# Patient Record
Sex: FEMALE | Employment: STUDENT | URBAN - METROPOLITAN AREA
[De-identification: names, ages, dates, MRNs, and addresses within clinical notes are randomized per-mention and may not be internally consistent; named-entity substitution may affect disease eponyms.]

---
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
## 2023-04-14 SDOH — HEALTH STABILITY: PHYSICAL HEALTH: ON AVERAGE, HOW MANY DAYS PER WEEK DO YOU ENGAGE IN MODERATE TO STRENUOUS EXERCISE (LIKE A BRISK WALK)?: 3 DAYS

## 2023-04-14 ASSESSMENT — SOCIAL DETERMINANTS OF HEALTH (SDOH)
WITHIN THE LAST YEAR, HAVE YOU BEEN HUMILIATED OR EMOTIONALLY ABUSED IN OTHER WAYS BY YOUR PARTNER OR EX-PARTNER?: PATIENT DECLINED
WITHIN THE LAST YEAR, HAVE TO BEEN RAPED OR FORCED TO HAVE ANY KIND OF SEXUAL ACTIVITY BY YOUR PARTNER OR EX-PARTNER?: NO
WITHIN THE LAST YEAR, HAVE YOU BEEN AFRAID OF YOUR PARTNER OR EX-PARTNER?: NO
WITHIN THE LAST YEAR, HAVE YOU BEEN KICKED, HIT, SLAPPED, OR OTHERWISE PHYSICALLY HURT BY YOUR PARTNER OR EX-PARTNER?: NO

## 2023-04-17 ENCOUNTER — OFFICE VISIT (OUTPATIENT)
Dept: FAMILY MEDICINE CLINIC | Age: 23
End: 2023-04-17

## 2023-04-17 VITALS
OXYGEN SATURATION: 99 % | TEMPERATURE: 98.2 F | HEIGHT: 61 IN | HEART RATE: 80 BPM | DIASTOLIC BLOOD PRESSURE: 80 MMHG | WEIGHT: 141 LBS | BODY MASS INDEX: 26.62 KG/M2 | SYSTOLIC BLOOD PRESSURE: 112 MMHG

## 2023-04-17 DIAGNOSIS — F32.A ANXIETY AND DEPRESSION: Primary | ICD-10-CM

## 2023-04-17 DIAGNOSIS — F41.9 ANXIETY AND DEPRESSION: Primary | ICD-10-CM

## 2023-04-17 RX ORDER — BUPROPION HYDROCHLORIDE 150 MG/1
150 TABLET ORAL EVERY MORNING
Qty: 30 TABLET | Refills: 1 | Status: SHIPPED | OUTPATIENT
Start: 2023-04-17

## 2023-04-17 RX ORDER — SECUKINUMAB 150 MG/ML
300 INJECTION SUBCUTANEOUS
COMMUNITY

## 2023-04-17 SDOH — ECONOMIC STABILITY: FOOD INSECURITY: WITHIN THE PAST 12 MONTHS, THE FOOD YOU BOUGHT JUST DIDN'T LAST AND YOU DIDN'T HAVE MONEY TO GET MORE.: NEVER TRUE

## 2023-04-17 SDOH — ECONOMIC STABILITY: INCOME INSECURITY: HOW HARD IS IT FOR YOU TO PAY FOR THE VERY BASICS LIKE FOOD, HOUSING, MEDICAL CARE, AND HEATING?: NOT HARD AT ALL

## 2023-04-17 SDOH — ECONOMIC STABILITY: HOUSING INSECURITY
IN THE LAST 12 MONTHS, WAS THERE A TIME WHEN YOU DID NOT HAVE A STEADY PLACE TO SLEEP OR SLEPT IN A SHELTER (INCLUDING NOW)?: NO

## 2023-04-17 SDOH — ECONOMIC STABILITY: FOOD INSECURITY: WITHIN THE PAST 12 MONTHS, YOU WORRIED THAT YOUR FOOD WOULD RUN OUT BEFORE YOU GOT MONEY TO BUY MORE.: NEVER TRUE

## 2023-04-17 ASSESSMENT — PATIENT HEALTH QUESTIONNAIRE - PHQ9
8. MOVING OR SPEAKING SO SLOWLY THAT OTHER PEOPLE COULD HAVE NOTICED. OR THE OPPOSITE, BEING SO FIGETY OR RESTLESS THAT YOU HAVE BEEN MOVING AROUND A LOT MORE THAN USUAL: 3
SUM OF ALL RESPONSES TO PHQ QUESTIONS 1-9: 26
SUM OF ALL RESPONSES TO PHQ QUESTIONS 1-9: 26
5. POOR APPETITE OR OVEREATING: 3
3. TROUBLE FALLING OR STAYING ASLEEP: 3
1. LITTLE INTEREST OR PLEASURE IN DOING THINGS: 2
SUM OF ALL RESPONSES TO PHQ9 QUESTIONS 1 & 2: 5
4. FEELING TIRED OR HAVING LITTLE ENERGY: 3
7. TROUBLE CONCENTRATING ON THINGS, SUCH AS READING THE NEWSPAPER OR WATCHING TELEVISION: 3
6. FEELING BAD ABOUT YOURSELF - OR THAT YOU ARE A FAILURE OR HAVE LET YOURSELF OR YOUR FAMILY DOWN: 3
9. THOUGHTS THAT YOU WOULD BE BETTER OFF DEAD, OR OF HURTING YOURSELF: 3
10. IF YOU CHECKED OFF ANY PROBLEMS, HOW DIFFICULT HAVE THESE PROBLEMS MADE IT FOR YOU TO DO YOUR WORK, TAKE CARE OF THINGS AT HOME, OR GET ALONG WITH OTHER PEOPLE: 0
2. FEELING DOWN, DEPRESSED OR HOPELESS: 3
SUM OF ALL RESPONSES TO PHQ QUESTIONS 1-9: 26
SUM OF ALL RESPONSES TO PHQ QUESTIONS 1-9: 23

## 2023-04-17 ASSESSMENT — COLUMBIA-SUICIDE SEVERITY RATING SCALE - C-SSRS
3. HAVE YOU BEEN THINKING ABOUT HOW YOU MIGHT KILL YOURSELF?: YES
4. HAVE YOU HAD THESE THOUGHTS AND HAD SOME INTENTION OF ACTING ON THEM?: NO
2. HAVE YOU ACTUALLY HAD ANY THOUGHTS OF KILLING YOURSELF?: YES
1. WITHIN THE PAST MONTH, HAVE YOU WISHED YOU WERE DEAD OR WISHED YOU COULD GO TO SLEEP AND NOT WAKE UP?: YES
6. HAVE YOU EVER DONE ANYTHING, STARTED TO DO ANYTHING, OR PREPARED TO DO ANYTHING TO END YOUR LIFE?: NO
5. HAVE YOU STARTED TO WORK OUT OR WORKED OUT THE DETAILS OF HOW TO KILL YOURSELF? DO YOU INTEND TO CARRY OUT THIS PLAN?: NO

## 2023-04-17 ASSESSMENT — ENCOUNTER SYMPTOMS
COLOR CHANGE: 0
VOMITING: 0
COUGH: 0
NAUSEA: 0
CONSTIPATION: 0
ABDOMINAL PAIN: 0
DIARRHEA: 0
SHORTNESS OF BREATH: 0

## 2023-04-17 NOTE — PROGRESS NOTES
Visit Information    Have you changed or started any medications since your last visit including any over-the-counter medicines, vitamins, or herbal medicines? no   Are you having any side effects from any of your medications? -  no  Have you stopped taking any of your medications? Is so, why? -  no    Have you seen any other physician or provider since your last visit? No  Have you had any other diagnostic tests since your last visit? No  Have you been seen in the emergency room and/or had an admission to a hospital since we last saw you? No  Have you had your routine dental cleaning in the past 6 months? no    Have you activated your [employer] account? If not, what are your barriers?  Yes     Patient Care Team:  Meka Osorio PA-C as PCP - General (Physician Assistant)    Medical History Review  Past Medical, Family, and Social History reviewed and  contribute to the patient presenting condition    Health Maintenance   Topic Date Due    COVID-19 Vaccine (1) Never done    Hepatitis A vaccine (2 of 2 - 2-dose series) 02/22/2007    HPV vaccine (1 - 2-dose series) Never done    Depression Screen  Never done    HIV screen  Never done    Chlamydia/GC screen  Never done    Hepatitis C screen  Never done    Flu vaccine (Season Ended) 08/01/2023    DTaP/Tdap/Td vaccine (7 - Td or Tdap) 10/21/2023    Hib vaccine  Completed    Varicella vaccine  Completed    Meningococcal (ACWY) vaccine  Completed    Pneumococcal 0-64 years Vaccine  Aged Out

## 2023-04-17 NOTE — PROGRESS NOTES
[facility]  [address] [facility] [address] [address]  Dept: [phone]  Dept Fax: [phone]    Tip Stiles is a 23 y.o. female who presents today for her medical conditions/complaintsas noted below. Tip Stiles is c/o of   Chief Complaint   Patient presents with    Anxiety     Looking to get a referral to see psychology  In the past she had been on Wellburtin, lexapro, trazodone    Depression    New Patient     Referral to see Gyn         HPI:     HPI    Patient is new to the office today. She reports she has a history of anxiety and depression since high school. She lives with her brother and grandmother. She has a lot of stress financially. Works full time as a manager at [employer]. Helps provide for her gma and younger brother. Making ends meet ok. She has been on zoloft, wellbutrin, lexapro, trazodone in the past. She has seen a therapist in the past along with a psychiatrist at [facility]. She feels the wellbutrin helped the most.    She does use ecigs and THC daily. States she has done sychidellic drugs in the past. Feels that \"messed up my brain more\". Has also used cocaine and meth in the past but did not like how she felt on these. Denies any present suicidal thoughts. She does like her job but feels limited support from her family and boyfriend. She has been trying to work on being stronger for herself. Has not had good support from family due to choices with transgender partner. She recently adopted a cat which she loves. Jasion is her name. Also has a dog named Brittney. She has been trying to cut off vaping, she is presently cutting down.      No results found for: LABA1C          ( goal A1Cis < 7)   No results found for: LABMICR  No results found for: LDLCHOLESTEROL, LDLCALC    (goal LDL is <100)   No results found for: AST, ALT, BUN, CR  BP Readings from Last 3 Encounters:   04/17/23 112/80   12/25/18 (!) 144/89 (>99 %, Z

## 2023-04-18 ENCOUNTER — HOSPITAL ENCOUNTER (OUTPATIENT)
Age: 23
Setting detail: SPECIMEN
Discharge: HOME | End: 2023-04-18

## 2023-04-18 DIAGNOSIS — F41.9 ANXIETY AND DEPRESSION: ICD-10-CM

## 2023-04-18 DIAGNOSIS — F32.A ANXIETY AND DEPRESSION: ICD-10-CM

## 2023-04-18 LAB
25(OH)D3 SERPL-MCNC: 16.2 NG/ML
ABSOLUTE EOS #: 0.14 K/UL (ref 0–0.44)
ABSOLUTE IMMATURE GRANULOCYTE: 0.03 K/UL (ref 0–0.3)
ABSOLUTE LYMPH #: 3.2 K/UL (ref 1.2–5.2)
ABSOLUTE MONO #: 0.54 K/UL (ref 0.1–1.4)
ALBUMIN SERPL-MCNC: 4.2 G/DL (ref 3.5–5.2)
ALBUMIN/GLOBULIN RATIO: 1.4 (ref 1–2.5)
ALP SERPL-CCNC: 108 U/L (ref 35–104)
ALT SERPL-CCNC: 16 U/L (ref 5–33)
ANION GAP SERPL CALCULATED.3IONS-SCNC: 12 MMOL/L (ref 9–17)
AST SERPL-CCNC: 20 U/L
BASOPHILS # BLD: 1 % (ref 0–2)
BASOPHILS ABSOLUTE: 0.04 K/UL (ref 0–0.2)
BILIRUB SERPL-MCNC: <0.1 MG/DL (ref 0.3–1.2)
BUN SERPL-MCNC: 5 MG/DL (ref 6–20)
CALCIUM SERPL-MCNC: 9.3 MG/DL (ref 8.6–10.4)
CHLORIDE SERPL-SCNC: 103 MMOL/L (ref 98–107)
CO2 SERPL-SCNC: 27 MMOL/L (ref 20–31)
CREAT SERPL-MCNC: 0.54 MG/DL (ref 0.5–0.9)
EOSINOPHILS RELATIVE PERCENT: 2 % (ref 1–4)
GFR SERPL CREATININE-BSD FRML MDRD: >60 ML/MIN/1.73M2
GLUCOSE SERPL-MCNC: 91 MG/DL (ref 70–99)
HCT VFR BLD AUTO: 41 % (ref 36.3–47.1)
HGB BLD-MCNC: 12.9 G/DL (ref 11.9–15.1)
IMMATURE GRANULOCYTES: 0 %
LYMPHOCYTES # BLD: 40 % (ref 25–45)
MCH RBC QN AUTO: 28.7 PG (ref 25.2–33.5)
MCHC RBC AUTO-ENTMCNC: 31.5 G/DL (ref 28.4–34.8)
MCV RBC AUTO: 91.3 FL (ref 82.6–102.9)
MONOCYTES # BLD: 7 % (ref 2–8)
NRBC AUTOMATED: 0 PER 100 WBC
PDW BLD-RTO: 12.3 % (ref 11.8–14.4)
PLATELET # BLD AUTO: 319 K/UL (ref 138–453)
PMV BLD AUTO: 9.9 FL (ref 8.1–13.5)
POTASSIUM SERPL-SCNC: 3.8 MMOL/L (ref 3.7–5.3)
PROT SERPL-MCNC: 7.2 G/DL (ref 6.4–8.3)
RBC # BLD: 4.49 M/UL (ref 3.95–5.11)
SEG NEUTROPHILS: 50 % (ref 34–64)
SEGMENTED NEUTROPHILS ABSOLUTE COUNT: 4.04 K/UL (ref 1.8–8)
SODIUM SERPL-SCNC: 142 MMOL/L (ref 135–144)
TSH SERPL-ACNC: 1.36 UIU/ML (ref 0.3–5)
WBC # BLD AUTO: 8 K/UL (ref 4.5–13.5)

## 2023-04-20 ENCOUNTER — HOSPITAL ENCOUNTER (EMERGENCY)
Age: 23
Discharge: HOME | End: 2023-04-20
Attending: EMERGENCY MEDICINE
Payer: COMMERCIAL

## 2023-04-20 ENCOUNTER — TELEPHONE (OUTPATIENT)
Dept: FAMILY MEDICINE CLINIC | Age: 23
End: 2023-04-20

## 2023-04-20 VITALS
TEMPERATURE: 98.7 F | RESPIRATION RATE: 16 BRPM | SYSTOLIC BLOOD PRESSURE: 116 MMHG | HEART RATE: 74 BPM | DIASTOLIC BLOOD PRESSURE: 72 MMHG | OXYGEN SATURATION: 100 %

## 2023-04-20 DIAGNOSIS — R10.2 VAGINAL PAIN: Primary | ICD-10-CM

## 2023-04-20 DIAGNOSIS — E55.9 VITAMIN D DEFICIENCY: Primary | ICD-10-CM

## 2023-04-20 LAB
ABSOLUTE EOS #: 0.05 K/UL (ref 0–0.44)
ABSOLUTE IMMATURE GRANULOCYTE: 0.02 K/UL (ref 0–0.3)
ABSOLUTE LYMPH #: 2.01 K/UL (ref 1.2–5.2)
ABSOLUTE MONO #: 0.39 K/UL (ref 0.1–1.4)
ANION GAP SERPL CALCULATED.3IONS-SCNC: 10 MMOL/L (ref 9–17)
BASOPHILS # BLD: 1 % (ref 0–2)
BASOPHILS ABSOLUTE: 0.04 K/UL (ref 0–0.2)
BILIRUBIN URINE: NEGATIVE
BUN SERPL-MCNC: 6 MG/DL (ref 6–20)
BUN/CREAT BLD: 12 (ref 9–20)
CALCIUM SERPL-MCNC: 9.3 MG/DL (ref 8.6–10.4)
CHLORIDE SERPL-SCNC: 107 MMOL/L (ref 98–107)
CO2 SERPL-SCNC: 28 MMOL/L (ref 20–31)
COLOR: YELLOW
COMMENT UA: NORMAL
CREAT SERPL-MCNC: 0.5 MG/DL (ref 0.5–0.9)
EOSINOPHILS RELATIVE PERCENT: 1 % (ref 1–4)
GFR SERPL CREATININE-BSD FRML MDRD: >60 ML/MIN/1.73M2
GLUCOSE SERPL-MCNC: 133 MG/DL (ref 70–99)
GLUCOSE UR STRIP.AUTO-MCNC: NEGATIVE MG/DL
HCG(URINE) PREGNANCY TEST: NEGATIVE
HCT VFR BLD AUTO: 41.2 % (ref 36.3–47.1)
HGB BLD-MCNC: 13.1 G/DL (ref 11.9–15.1)
IMMATURE GRANULOCYTES: 0 %
KETONES UR STRIP.AUTO-MCNC: NEGATIVE MG/DL
LEUKOCYTE ESTERASE UR QL STRIP.AUTO: NEGATIVE
LYMPHOCYTES # BLD: 29 % (ref 25–45)
MCH RBC QN AUTO: 28.3 PG (ref 25.2–33.5)
MCHC RBC AUTO-ENTMCNC: 31.8 G/DL (ref 28.4–34.8)
MCV RBC AUTO: 89 FL (ref 82.6–102.9)
MONOCYTES # BLD: 6 % (ref 2–8)
NITRITE UR QL STRIP.AUTO: NEGATIVE
NRBC AUTOMATED: 0 PER 100 WBC
PDW BLD-RTO: 12.5 % (ref 11.8–14.4)
PLATELET # BLD AUTO: 293 K/UL (ref 138–453)
PMV BLD AUTO: 9.3 FL (ref 8.1–13.5)
POTASSIUM SERPL-SCNC: 3.6 MMOL/L (ref 3.7–5.3)
PROT UR STRIP.AUTO-MCNC: 6.5 MG/DL (ref 5–8)
PROT UR STRIP.AUTO-MCNC: NEGATIVE MG/DL
RBC # BLD: 4.63 M/UL (ref 3.95–5.11)
SEG NEUTROPHILS: 63 % (ref 34–64)
SEGMENTED NEUTROPHILS ABSOLUTE COUNT: 4.54 K/UL (ref 1.8–8)
SODIUM SERPL-SCNC: 145 MMOL/L (ref 135–144)
SPECIFIC GRAVITY UA: 1.01 (ref 1–1.03)
TURBIDITY: CLEAR
URINE HGB: NEGATIVE
UROBILINOGEN, URINE: NORMAL
WBC # BLD AUTO: 7.1 K/UL (ref 4.5–13.5)

## 2023-04-20 PROCEDURE — 80048 BASIC METABOLIC PNL TOTAL CA: CPT

## 2023-04-20 PROCEDURE — 85025 COMPLETE CBC W/AUTO DIFF WBC: CPT

## 2023-04-20 PROCEDURE — 81025 URINE PREGNANCY TEST: CPT

## 2023-04-20 PROCEDURE — 81003 URINALYSIS AUTO W/O SCOPE: CPT

## 2023-04-20 PROCEDURE — 99285 EMERGENCY DEPT VISIT HI MDM: CPT

## 2023-04-20 RX ORDER — ERGOCALCIFEROL 1.25 MG/1
50000 CAPSULE ORAL WEEKLY
Qty: 12 CAPSULE | Refills: 1 | Status: SHIPPED | OUTPATIENT
Start: 2023-04-20

## 2023-04-20 ASSESSMENT — PAIN DESCRIPTION - LOCATION: LOCATION: VAGINA

## 2023-04-20 ASSESSMENT — ENCOUNTER SYMPTOMS
EYE DISCHARGE: 0
VOMITING: 0
EYE REDNESS: 0
COLOR CHANGE: 0
SHORTNESS OF BREATH: 0
COUGH: 0
CONSTIPATION: 0
DIARRHEA: 0
ABDOMINAL PAIN: 0
FACIAL SWELLING: 0

## 2023-04-20 ASSESSMENT — PAIN - FUNCTIONAL ASSESSMENT: PAIN_FUNCTIONAL_ASSESSMENT: 0-10

## 2023-04-20 ASSESSMENT — PAIN DESCRIPTION - FREQUENCY: FREQUENCY: CONTINUOUS

## 2023-04-20 ASSESSMENT — PAIN SCALES - GENERAL: PAINLEVEL_OUTOF10: 5

## 2023-04-20 ASSESSMENT — PAIN DESCRIPTION - DESCRIPTORS: DESCRIPTORS: DULL

## 2023-04-20 NOTE — ED TRIAGE NOTES
Pt to ed with family c/o vaginal irritation. Pt states she has been having pain with tampon use. Pt states she started menses on Sunday. Pt rates pain 5/10. Pt denies yellow or green discharge. When asking SI/HI questions pt states she has been having suicidal thoughts recently. Pt states she has taken the steps to make a counseling appt and got put on medication to help. Pt denies having a plan.

## 2023-04-20 NOTE — ED PROVIDER NOTES
folliculitis, cellulitis, UTI, pregnancy      I will order labs including CBC, BMP, pregnancy test, urinalysis    Test considered, but not ordered: None    The patient was involved in his/her plan of care. The testing that was ordered was discussed with the patient. Any medications that may have been ordered were discussed with the patient. I have reviewed the patient's previous medical records using the electronic health record that we have available. Labs were reviewed    CONSULTS:  None    PROCEDURES:  None    FINAL IMPRESSION      1. Vaginal pain          DISPOSITION/PLAN   DISPOSITION Decision To Discharge 04/20/2023 01:03:22 PM      PATIENT REFERRED TO:   Jennifer Olsen PA-C  [address] [place] [address]  [phone]      As needed    [facility] ED  [address] [facility]  [phone]    If symptoms worsen      DISCHARGE MEDICATIONS:     New Prescriptions    No medications on file       The care is provided during an unprecedented national emergency due to the novel coronavirus, COVID-19.     (Please note that portions of this note were completed with a voice recognition program.  Efforts were made to edit the dictations but occasionally words are mis-transcribed.)    José Monroe MD  Attending Emergency Physician            José Monroe MD  04/20/23 [id number]

## 2023-04-20 NOTE — TELEPHONE ENCOUNTER
Patient states she started her period and over the past 2 day she is uncomfortable and extra skin hanging outside of her body describes it as something hanging outside of her body. and it is very irritated and swollen down there. Patient is asking what she should do. Patient advised to go to the ER.

## 2023-05-08 ENCOUNTER — OFFICE VISIT (OUTPATIENT)
Dept: FAMILY MEDICINE CLINIC | Age: 23
End: 2023-05-08
Payer: COMMERCIAL

## 2023-05-08 VITALS
SYSTOLIC BLOOD PRESSURE: 112 MMHG | WEIGHT: 133 LBS | HEIGHT: 61 IN | DIASTOLIC BLOOD PRESSURE: 60 MMHG | HEART RATE: 85 BPM | OXYGEN SATURATION: 99 % | BODY MASS INDEX: 25.11 KG/M2

## 2023-05-08 DIAGNOSIS — F41.9 ANXIETY AND DEPRESSION: Primary | ICD-10-CM

## 2023-05-08 DIAGNOSIS — F32.A ANXIETY AND DEPRESSION: Primary | ICD-10-CM

## 2023-05-08 PROCEDURE — 99213 OFFICE O/P EST LOW 20 MIN: CPT | Performed by: PHYSICIAN ASSISTANT

## 2023-05-08 ASSESSMENT — ENCOUNTER SYMPTOMS: COLOR CHANGE: 0

## 2023-05-08 NOTE — PROGRESS NOTES
[facility]  [address] [facility] [address] [address]  Dept: [phone]  Dept Fax: [phone]    Neal Schultz is a 23 y.o. female who presents today for her medical conditions/complaintsas noted below. Neal Schultz is c/o of   Chief Complaint   Patient presents with    Anxiety    Depression    Medication Check         HPI:     HPI    Patient is here for follow up after starting wellbutrin for depression. She states she feels it has helped her manage work better but feels tired on it and feels her anxiety has increased. She states she can get angry and irritated fairly often. She states small things going wrong will trigger her to be anger. She states she has thought of self harm in the past, had cut and burned herself in the past. She denies any actions like that now. She does note that she will skip food if she is really depressed. Causes weight loss. She notes that she has done counseling for that in the past with her mother, never alone. Patient states she does see enough benefit on the wellbutrin at this point that she would like to continue taking it. She has an appointment tomorrow to see a counselor.      No results found for: LABA1C          ( goal A1Cis < 7)   No results found for: LABMICR  No results found for: LDLCHOLESTEROL, LDLCALC    (goal LDL is <100)   AST (U/L)   Date Value   04/18/2023 20     ALT (U/L)   Date Value   04/18/2023 16     BUN (mg/dL)   Date Value   04/20/2023 6     BP Readings from Last 3 Encounters:   05/08/23 112/60   04/20/23 116/72   04/17/23 112/80          (goal 120/80)    Past Medical History:   Diagnosis Date    Anxiety     Depression     Psoriasis       Past Surgical History:   Procedure Laterality Date    TONSILLECTOMY         Family History   Problem Relation Age of Onset    No Known Problems Mother     No Known Problems Father     Breast Cancer Maternal Aunt     High Blood Pressure Maternal Grandmother

## 2023-05-08 NOTE — PROGRESS NOTES
Visit Information    Have you changed or started any medications since your last visit including any over-the-counter medicines, vitamins, or herbal medicines? no   Are you having any side effects from any of your medications? -  no  Have you stopped taking any of your medications? Is so, why? -  no    Have you seen any other physician or provider since your last visit? No  Have you had any other diagnostic tests since your last visit? No  Have you been seen in the emergency room and/or had an admission to a hospital since we last saw you? No  Have you had your routine dental cleaning in the past 6 months? no    Have you activated your [employer] account? If not, what are your barriers?  Yes     Patient Care Team:  Enrique Ohara PA-C as PCP - General (Physician Assistant)  Enrique Ohara PA-C as PCP - Empaneled Provider    Medical History Review  Past Medical, Family, and Social History reviewed and  contribute to the patient presenting condition    Health Maintenance   Topic Date Due    COVID-19 Vaccine (1) Never done    Hepatitis A vaccine (2 of 2 - 2-dose series) 02/22/2007    Pneumococcal 0-64 years Vaccine (1 - PCV) 05/18/2008    HPV vaccine (1 - 2-dose series) Never done    HIV screen  Never done    Chlamydia/GC screen  Never done    Hepatitis C screen  Never done    Flu vaccine (Season Ended) 08/01/2023    DTaP/Tdap/Td vaccine (7 - Td or Tdap) 10/21/2023    Depression Monitoring  04/17/2024    Hib vaccine  Completed    Varicella vaccine  Completed    Meningococcal (ACWY) vaccine  Completed    Depression Screen  Discontinued

## 2023-05-09 ENCOUNTER — OFFICE VISIT (OUTPATIENT)
Dept: BEHAVIORAL/MENTAL HEALTH CLINIC | Age: 23
End: 2023-05-09

## 2023-05-09 DIAGNOSIS — F41.1 GENERALIZED ANXIETY DISORDER: ICD-10-CM

## 2023-05-09 DIAGNOSIS — F33.2 SEVERE EPISODE OF RECURRENT MAJOR DEPRESSIVE DISORDER, WITHOUT PSYCHOTIC FEATURES (HCC): ICD-10-CM

## 2023-05-09 PROBLEM — F32.9 MAJOR DEPRESSIVE DISORDER: Status: ACTIVE | Noted: 2023-05-09

## 2023-05-09 ASSESSMENT — COLUMBIA-SUICIDE SEVERITY RATING SCALE - C-SSRS
6. HAVE YOU EVER DONE ANYTHING, STARTED TO DO ANYTHING, OR PREPARED TO DO ANYTHING TO END YOUR LIFE?: NO
3. HAVE YOU BEEN THINKING ABOUT HOW YOU MIGHT KILL YOURSELF?: NO
4. HAVE YOU HAD THESE THOUGHTS AND HAD SOME INTENTION OF ACTING ON THEM?: NO
5. HAVE YOU STARTED TO WORK OUT OR WORKED OUT THE DETAILS OF HOW TO KILL YOURSELF? DO YOU INTEND TO CARRY OUT THIS PLAN?: NO
1. WITHIN THE PAST MONTH, HAVE YOU WISHED YOU WERE DEAD OR WISHED YOU COULD GO TO SLEEP AND NOT WAKE UP?: YES
2. HAVE YOU ACTUALLY HAD ANY THOUGHTS OF KILLING YOURSELF?: YES

## 2023-05-09 ASSESSMENT — PATIENT HEALTH QUESTIONNAIRE - PHQ9
9. THOUGHTS THAT YOU WOULD BE BETTER OFF DEAD, OR OF HURTING YOURSELF: 2
3. TROUBLE FALLING OR STAYING ASLEEP: 3
SUM OF ALL RESPONSES TO PHQ9 QUESTIONS 1 & 2: 5
1. LITTLE INTEREST OR PLEASURE IN DOING THINGS: 3
SUM OF ALL RESPONSES TO PHQ QUESTIONS 1-9: 24
SUM OF ALL RESPONSES TO PHQ QUESTIONS 1-9: 24
6. FEELING BAD ABOUT YOURSELF - OR THAT YOU ARE A FAILURE OR HAVE LET YOURSELF OR YOUR FAMILY DOWN: 3
SUM OF ALL RESPONSES TO PHQ QUESTIONS 1-9: 22
SUM OF ALL RESPONSES TO PHQ QUESTIONS 1-9: 24
8. MOVING OR SPEAKING SO SLOWLY THAT OTHER PEOPLE COULD HAVE NOTICED. OR THE OPPOSITE, BEING SO FIGETY OR RESTLESS THAT YOU HAVE BEEN MOVING AROUND A LOT MORE THAN USUAL: 2
4. FEELING TIRED OR HAVING LITTLE ENERGY: 3
10. IF YOU CHECKED OFF ANY PROBLEMS, HOW DIFFICULT HAVE THESE PROBLEMS MADE IT FOR YOU TO DO YOUR WORK, TAKE CARE OF THINGS AT HOME, OR GET ALONG WITH OTHER PEOPLE: 2
5. POOR APPETITE OR OVEREATING: 3
7. TROUBLE CONCENTRATING ON THINGS, SUCH AS READING THE NEWSPAPER OR WATCHING TELEVISION: 3
2. FEELING DOWN, DEPRESSED OR HOPELESS: 2

## 2023-05-09 ASSESSMENT — ANXIETY QUESTIONNAIRES
4. TROUBLE RELAXING: 2
GAD7 TOTAL SCORE: 18
2. NOT BEING ABLE TO STOP OR CONTROL WORRYING: 2
1. FEELING NERVOUS, ANXIOUS, OR ON EDGE: 3
3. WORRYING TOO MUCH ABOUT DIFFERENT THINGS: 3
IF YOU CHECKED OFF ANY PROBLEMS ON THIS QUESTIONNAIRE, HOW DIFFICULT HAVE THESE PROBLEMS MADE IT FOR YOU TO DO YOUR WORK, TAKE CARE OF THINGS AT HOME, OR GET ALONG WITH OTHER PEOPLE: VERY DIFFICULT
7. FEELING AFRAID AS IF SOMETHING AWFUL MIGHT HAPPEN: 2
5. BEING SO RESTLESS THAT IT IS HARD TO SIT STILL: 3
6. BECOMING EASILY ANNOYED OR IRRITABLE: 3

## 2023-05-09 NOTE — PROGRESS NOTES
ADULT BEHAVIORAL HEALTH ASSESSMENT  [facility]      Visit Date: 5/9/2023   Time of appointment:  3p   Time spent with Patient: 52 minutes. This is patient's first appointment. Reason for Consult:  New Patient     Referring Provider/PCP:    ROBY Grimes PA-C      Pt provided informed consent for the behavioral health program. Discussed with patient model of service to include the limits of confidentiality (i.e. abuse reporting, suicide intervention, etc.) and short-term intervention focused approach. Pt indicated understanding. PRESENTING PROBLEM AND HISTORY  Ricardo Barbosa is a 23 y.o. female who presents for new evaluation and treatment of anxiety, depression.   She has the following symptoms: depressed mood, decreased appetite, weight gain, weight loss, increased sleep, decreased sleep, excessive crying, fatigue/lack of energy, lack of motivation, low self-esteem, isolating self, feelings of worthlessness, feeling unable to make decisions, recurrent thoughts of death, suicidal thoughts without plan, suicide attempt, anger/irritability, impulsive/reckless/risky behavior, mood swings, having more energy than usual, increase in goal directed activity, excessive talking/pressure to keep talking, inflated self-esteem or grandiosity, racing thoughts/flight of ideas, feeling nervous, anxious, or on edge, racing worry thoughts, excessive anxiety and worry about specific stressors, excessive worry about a number of events or activities , inability to stop or control worry, avoidance of situations that provoke fear and anxiety, unexpected panic attacks, persistent worry about additional panic attacks, feeling afraid something awful might happen, nightmares or flashbacks about an experience that was horrible, frightening, or upsetting, trying hard not to think of a horrible, frightening, or upsetting event, recurrent and persistent thoughts/urges/images that are intrusive and unwanted,

## 2023-05-19 ENCOUNTER — OFFICE VISIT (OUTPATIENT)
Dept: BEHAVIORAL/MENTAL HEALTH CLINIC | Age: 23
End: 2023-05-19

## 2023-05-19 DIAGNOSIS — F33.2 SEVERE EPISODE OF RECURRENT MAJOR DEPRESSIVE DISORDER, WITHOUT PSYCHOTIC FEATURES (HCC): ICD-10-CM

## 2023-05-19 DIAGNOSIS — F41.1 GENERALIZED ANXIETY DISORDER: Primary | ICD-10-CM

## 2023-05-19 NOTE — PROGRESS NOTES
ADULT BEHAVIORAL HEALTH FOLLOW UP  [facility]      Visit Date: 5/19/2023   Time of appointment:  9a   Time spent with Patient: 53 minutes. This is patient's first appointment. Reason for Consult: Follow-up, Depression, and Anxiety     Referring Provider/PCP:    No ref. provider found  Marjan Edward PA-C      Pt provided informed consent for the behavioral health program. Discussed with patient model of service to include the limits of confidentiality (i.e. abuse reporting, suicide intervention, etc.) and short-term intervention focused approach. Pt indicated understanding. Allyson Randall is a 26 y.o. female who presents for follow up of anxiety and depressive sx. Clt identified this past week was a mix of emotions ranging from irritability to increased anxiety. Clt reported at work she noticed increased anger and reported episodes of crying spells that she could not pinpoint what led to this. Clt identified she noticed she was reacting to the smallest inconveniences. Clt and therapist processed financial stressors and how she handles conflicts around this. Clt and therapist processed her impulsivity around spending. Clt reported she enjoys watching horror or true crime shows but they trigger negative thinking for her. Clt reported she will try to sleep off the thinking and identified she is hopeful things will get better but identified her depression can cause difficulty with this thinking. Clt identified she has difficulty setting boundaries due to not wanting to upset others. clt reported feelings of hopelessness and thoughts of self-harm but identified she is able to stop herself from acting on her thoughts. Clt reported passive SI but denied plan/intent. Clt reported she has more difficulty with working through her self-harm thoughts but she reported she is able to manage them. Provided psychoeducation on grounding techniques and how to reduce anxious/depressive sx.  Provided clt with

## 2023-05-23 DIAGNOSIS — F41.9 ANXIETY AND DEPRESSION: ICD-10-CM

## 2023-05-23 DIAGNOSIS — F32.A ANXIETY AND DEPRESSION: ICD-10-CM

## 2023-05-23 RX ORDER — BUPROPION HYDROCHLORIDE 150 MG/1
150 TABLET ORAL EVERY MORNING
Qty: 30 TABLET | Refills: 1 | Status: SHIPPED | OUTPATIENT
Start: 2023-05-23

## 2023-05-23 NOTE — TELEPHONE ENCOUNTER
I had previously sent it to right [facility] in [place].  Check to see if they have it please

## 2023-05-23 NOTE — TELEPHONE ENCOUNTER
Patient called stating that she was in [place] this past weekend and she thinks that she may have lost her script there or forgot it. She wants to know if Khari Anderson can send another script to her pharmacy for the wellbutrin 150mg.      Pended below       [facility] [address] [employer]

## 2023-05-23 NOTE — TELEPHONE ENCOUNTER
He states [facility] [address] on [facility] 30 tablets and no refills.

## 2023-06-02 ENCOUNTER — OFFICE VISIT (OUTPATIENT)
Dept: BEHAVIORAL/MENTAL HEALTH CLINIC | Age: 23
End: 2023-06-02

## 2023-06-02 DIAGNOSIS — F41.1 GENERALIZED ANXIETY DISORDER: ICD-10-CM

## 2023-06-02 DIAGNOSIS — F33.2 SEVERE EPISODE OF RECURRENT MAJOR DEPRESSIVE DISORDER, WITHOUT PSYCHOTIC FEATURES (HCC): Primary | ICD-10-CM

## 2023-06-02 NOTE — PROGRESS NOTES
ADULT BEHAVIORAL HEALTH FOLLOW UP  [facility]      Visit Date: 6/2/2023   Time of appointment:  3:10p   Time spent with Patient: 53 minutes. This is patient's second appointment. Reason for Consult: Follow-up, Anxiety, and Depression     Referring Provider/PCP:    No ref. provider found  Garry Hawkins PA-C      Pt provided informed consent for the behavioral health program. Discussed with patient model of service to include the limits of confidentiality (i.e. abuse reporting, suicide intervention, etc.) and short-term intervention focused approach. Pt indicated understanding. Dhruv Gilliam is a 26 y.o. female who presents for follow up of anxiety and depression sx. Clt and therapist processed how her initial appointment with the psychiatrist went. Clt and therapist processed frustrations. Clt reported she did not utilize the coping skills due to feeling like she was too busy with work. Clt identified she has been working and sleeping and has not been socializing much with others. She reported she is fine with this. Clt identified she could have utilized coping skills today with her work stressors. Therapist attempted to process stressors but clt presented with partial insight into this past week and what her stressors were. Clt identified she has difficulty with her memory. Clt and therapist processed relationships. Clt denied active SI. Clt reported she did add an extra ponytail to her wrist and uses this as a fidget. Clt reported she smoked weed after work and this helped her calm down. Clt identified she notices that ensuring she gets her sleep has helped increase her mood. Therapist encouraged clt to work on coping skills. Previous Recommendations: Clt encouraged to utilize grounding technique handout provided and to begin practicing new coping skills. Clt encouraged to try using fidget toys to see if this helps release some tension she experiences.  Clt reported she is open to

## 2023-06-30 ENCOUNTER — TELEPHONE (OUTPATIENT)
Dept: FAMILY MEDICINE CLINIC | Age: 23
End: 2023-06-30

## 2023-12-28 ENCOUNTER — OFFICE VISIT (OUTPATIENT)
Dept: PRIMARY CARE CLINIC | Age: 23
End: 2023-12-28
Payer: COMMERCIAL

## 2023-12-28 VITALS
BODY MASS INDEX: 25.11 KG/M2 | SYSTOLIC BLOOD PRESSURE: 121 MMHG | WEIGHT: 133 LBS | DIASTOLIC BLOOD PRESSURE: 73 MMHG | HEIGHT: 61 IN | OXYGEN SATURATION: 100 % | TEMPERATURE: 97.9 F | HEART RATE: 72 BPM

## 2023-12-28 DIAGNOSIS — H65.191 ACUTE OTITIS MEDIA WITH EFFUSION OF RIGHT EAR: Primary | ICD-10-CM

## 2023-12-28 PROCEDURE — 99213 OFFICE O/P EST LOW 20 MIN: CPT | Performed by: NURSE PRACTITIONER

## 2023-12-28 RX ORDER — FLUTICASONE PROPIONATE 50 MCG
2 SPRAY, SUSPENSION (ML) NASAL DAILY
Qty: 32 G | Refills: 0 | Status: SHIPPED | OUTPATIENT
Start: 2023-12-28

## 2023-12-28 RX ORDER — AMOXICILLIN 875 MG/1
875 TABLET, COATED ORAL 2 TIMES DAILY
Qty: 14 TABLET | Refills: 0 | Status: SHIPPED | OUTPATIENT
Start: 2023-12-28 | End: 2024-01-04

## 2024-01-18 ENCOUNTER — OFFICE VISIT (OUTPATIENT)
Dept: PRIMARY CARE CLINIC | Age: 24
End: 2024-01-18
Payer: COMMERCIAL

## 2024-01-18 VITALS
DIASTOLIC BLOOD PRESSURE: 77 MMHG | HEART RATE: 69 BPM | OXYGEN SATURATION: 99 % | SYSTOLIC BLOOD PRESSURE: 126 MMHG | TEMPERATURE: 98.2 F

## 2024-01-18 DIAGNOSIS — H66.004 RECURRENT ACUTE SUPPURATIVE OTITIS MEDIA OF RIGHT EAR WITHOUT SPONTANEOUS RUPTURE OF TYMPANIC MEMBRANE: Primary | ICD-10-CM

## 2024-01-18 PROCEDURE — 99213 OFFICE O/P EST LOW 20 MIN: CPT | Performed by: FAMILY MEDICINE

## 2024-01-18 RX ORDER — AMOXICILLIN AND CLAVULANATE POTASSIUM 875; 125 MG/1; MG/1
1 TABLET, FILM COATED ORAL 2 TIMES DAILY
Qty: 14 TABLET | Refills: 0 | Status: SHIPPED | OUTPATIENT
Start: 2024-01-18 | End: 2024-01-25

## 2024-01-18 ASSESSMENT — ENCOUNTER SYMPTOMS
VOMITING: 0
DIARRHEA: 0
RHINORRHEA: 1
COUGH: 0
SORE THROAT: 0

## 2024-01-18 ASSESSMENT — PATIENT HEALTH QUESTIONNAIRE - PHQ9: DEPRESSION UNABLE TO ASSESS: PT REFUSES

## 2024-01-18 NOTE — PATIENT INSTRUCTIONS
Take augmentin as prescribed for ear infection  Continue over the counter cough/cold medications as needed for symptoms  If symptoms worsen or do not improve please follow-up with PCP or return to clinic

## 2024-01-18 NOTE — PROGRESS NOTES
Mood and Affect: Mood normal.         Behavior: Behavior normal.         Thought Content: Thought content normal.         Judgment: Judgment normal.       /77   Pulse 69   Temp 98.2 °F (36.8 °C) (Tympanic)   SpO2 99%     Assessment:       Diagnosis Orders   1. Recurrent acute suppurative otitis media of right ear without spontaneous rupture of tympanic membrane            Plan:      Take augmentin as prescribed for ear infection  Continue over the counter cough/cold medications as needed for symptoms  If symptoms worsen or do not improve please follow-up with PCP or return to clinic  No orders of the defined types were placed in this encounter.    Orders Placed This Encounter   Medications    amoxicillin-clavulanate (AUGMENTIN) 875-125 MG per tablet     Sig: Take 1 tablet by mouth 2 times daily for 7 days     Dispense:  14 tablet     Refill:  0      Patient given educational materials - see patient instructions.  Discussed use, benefit, and side effects of prescribed medications.  All patient questions answered.  Pt voiced understanding. Patient agreed with treatment plan. Follow up as directed.     Electronicallysigned by ROBYN Landaverde MD on 1/18/2024 at 8:22 AM

## 2024-07-31 ENCOUNTER — OFFICE VISIT (OUTPATIENT)
Dept: PRIMARY CARE CLINIC | Age: 24
End: 2024-07-31
Payer: COMMERCIAL

## 2024-07-31 VITALS
HEART RATE: 72 BPM | TEMPERATURE: 97.7 F | SYSTOLIC BLOOD PRESSURE: 119 MMHG | OXYGEN SATURATION: 99 % | DIASTOLIC BLOOD PRESSURE: 80 MMHG

## 2024-07-31 DIAGNOSIS — H66.91 RIGHT ACUTE OTITIS MEDIA: Primary | ICD-10-CM

## 2024-07-31 PROCEDURE — 99213 OFFICE O/P EST LOW 20 MIN: CPT | Performed by: NURSE PRACTITIONER

## 2024-07-31 RX ORDER — AMOXICILLIN AND CLAVULANATE POTASSIUM 875; 125 MG/1; MG/1
1 TABLET, FILM COATED ORAL 2 TIMES DAILY
Qty: 14 TABLET | Refills: 0 | Status: SHIPPED | OUTPATIENT
Start: 2024-07-31 | End: 2024-08-07

## 2024-07-31 ASSESSMENT — ENCOUNTER SYMPTOMS
EYE REDNESS: 0
COUGH: 0
SORE THROAT: 0
VOICE CHANGE: 0
CHEST TIGHTNESS: 0
WHEEZING: 0
EYE DISCHARGE: 0
SINUS PRESSURE: 0
SHORTNESS OF BREATH: 0

## 2024-07-31 NOTE — PROGRESS NOTES
[facility]  [address]  [facility] [address]  Dept: [phone]  Dept Fax: [phone]     Sahra Simon is a 24 y.o. female who presents to the urgent care today for her medicalconditions/complaints as noted below.  Sahra Simon is c/o of Otalgia (Right, x 2 days )    HPI:      Otalgia   There is pain in the right ear. This is a new problem. The current episode started in the past 7 days (2 days ago). The problem has been gradually worsening. There has been no fever. Pertinent negatives include no coughing, ear discharge, headaches, rash or sore throat. She has tried nothing for the symptoms. The treatment provided no relief.       Past Medical History:   Diagnosis Date    Anxiety     Depression     Psoriasis            Current Outpatient Medications   Medication Sig Dispense Refill    amoxicillin-clavulanate (AUGMENTIN) 875-125 MG per tablet Take 1 tablet by mouth 2 times daily for 7 days 14 tablet 0    fluticasone (FLONASE) 50 MCG/ACT nasal spray 2 sprays by Each Nostril route daily (Patient not taking: Reported on 7/31/2024) 32 g 0    buPROPion (WELLBUTRIN XL) 150 MG extended release tablet take 1 tablet by mouth every morning (Patient not taking: Reported on 1/18/2024) 30 tablet 1    vitamin D (ERGOCALCIFEROL) 1.25 MG (74565 UT) CAPS capsule Take 1 capsule by mouth once a week (Patient not taking: Reported on 7/31/2024) 12 capsule 1    secukinumab (COSENTYX) 150 MG/ML SOSY Inject 2 mLs into the skin (Patient not taking: Reported on 7/31/2024)       No current facility-administered medications for this visit.     No Known Allergies    Subjective:      Review of Systems   Constitutional:  Negative for chills, fatigue and fever.   HENT:  Positive for ear pain (right). Negative for congestion, ear discharge, postnasal drip, sinus pressure, sneezing, sore throat and voice change.    Eyes:  Negative for discharge and redness.

## 2024-08-09 ENCOUNTER — TELEPHONE (OUTPATIENT)
Dept: PRIMARY CARE CLINIC | Age: 24
End: 2024-08-09

## 2024-08-09 RX ORDER — FLUCONAZOLE 150 MG/1
150 TABLET ORAL
Qty: 2 TABLET | Refills: 0 | Status: SHIPPED | OUTPATIENT
Start: 2024-08-09 | End: 2024-08-15

## 2024-08-09 NOTE — TELEPHONE ENCOUNTER
Pt stated started breaking out with rash from abx she discontinued pt is asking for treatment for a yeast infection she stated did treat with [facility] [place]

## 2024-08-14 ENCOUNTER — OFFICE VISIT (OUTPATIENT)
Dept: PRIMARY CARE CLINIC | Age: 24
End: 2024-08-14
Payer: COMMERCIAL

## 2024-08-14 VITALS
SYSTOLIC BLOOD PRESSURE: 124 MMHG | TEMPERATURE: 98.6 F | HEART RATE: 86 BPM | DIASTOLIC BLOOD PRESSURE: 79 MMHG | OXYGEN SATURATION: 99 %

## 2024-08-14 DIAGNOSIS — B37.9 ANTIBIOTIC-INDUCED YEAST INFECTION: ICD-10-CM

## 2024-08-14 DIAGNOSIS — T36.95XA ANTIBIOTIC-INDUCED YEAST INFECTION: ICD-10-CM

## 2024-08-14 DIAGNOSIS — K61.0 PERIANAL ABSCESS: Primary | ICD-10-CM

## 2024-08-14 PROCEDURE — 99213 OFFICE O/P EST LOW 20 MIN: CPT | Performed by: NURSE PRACTITIONER

## 2024-08-14 RX ORDER — CEPHALEXIN 500 MG/1
500 CAPSULE ORAL 3 TIMES DAILY
Qty: 21 CAPSULE | Refills: 0 | Status: SHIPPED | OUTPATIENT
Start: 2024-08-14 | End: 2024-08-21

## 2024-08-14 RX ORDER — FLUCONAZOLE 150 MG/1
150 TABLET ORAL
Qty: 4 TABLET | Refills: 0 | Status: SHIPPED | OUTPATIENT
Start: 2024-08-14 | End: 2024-08-24

## 2024-08-14 ASSESSMENT — ENCOUNTER SYMPTOMS
CHEST TIGHTNESS: 0
COUGH: 0
RESPIRATORY NEGATIVE: 1
WHEEZING: 0
SHORTNESS OF BREATH: 0

## 2024-08-14 NOTE — PROGRESS NOTES
[facility]  [address]  [facility] [address]  Dept: [phone]  Dept Fax: [phone]     Sahra Simon is a 24 y.o. female who presents to the urgent care today for her medicalconditions/complaints as noted below.  Sahra Simon is c/o of Rash (Thought to be from augmentin then developed yeast infection and took diflucan but now has a lump down there)    HPI:      Rash  This is a new problem. The current episode started in the past 7 days. The problem has been gradually worsening since onset. Location: perianal area. The rash is characterized by pain, redness and swelling. It is unknown if there was an exposure to a precipitant. Pertinent negatives include no cough, fatigue, fever or shortness of breath. Treatments tried: heating pad, otc tx. The treatment provided no relief.       Past Medical History:   Diagnosis Date    Anxiety     Depression     Psoriasis            Current Outpatient Medications   Medication Sig Dispense Refill    cephALEXin (KEFLEX) 500 MG capsule Take 1 capsule by mouth in the morning, at noon, and at bedtime for 7 days 21 capsule 0    fluconazole (DIFLUCAN) 150 MG tablet Take 1 tablet by mouth every 72 hours for 4 doses 4 tablet 0    fluconazole (DIFLUCAN) 150 MG tablet Take 1 tablet by mouth every 72 hours for 6 days (Patient not taking: Reported on 8/14/2024) 2 tablet 0    fluticasone (FLONASE) 50 MCG/ACT nasal spray 2 sprays by Each Nostril route daily (Patient not taking: Reported on 7/31/2024) 32 g 0    buPROPion (WELLBUTRIN XL) 150 MG extended release tablet take 1 tablet by mouth every morning (Patient not taking: Reported on 1/18/2024) 30 tablet 1    vitamin D (ERGOCALCIFEROL) 1.25 MG (48442 UT) CAPS capsule Take 1 capsule by mouth once a week (Patient not taking: Reported on 7/31/2024) 12 capsule 1    secukinumab (COSENTYX) 150 MG/ML SOSY Inject 2 mLs into the skin (Patient not taking: Reported

## 2024-08-20 ENCOUNTER — OFFICE VISIT (OUTPATIENT)
Dept: FAMILY MEDICINE CLINIC | Age: 24
End: 2024-08-20
Payer: COMMERCIAL

## 2024-08-20 VITALS
OXYGEN SATURATION: 99 % | HEART RATE: 78 BPM | DIASTOLIC BLOOD PRESSURE: 60 MMHG | HEIGHT: 61 IN | TEMPERATURE: 97.7 F | BODY MASS INDEX: 23.41 KG/M2 | SYSTOLIC BLOOD PRESSURE: 110 MMHG | WEIGHT: 124 LBS

## 2024-08-20 DIAGNOSIS — K61.1 PERIRECTAL ABSCESS: ICD-10-CM

## 2024-08-20 DIAGNOSIS — F33.0 MILD EPISODE OF RECURRENT MAJOR DEPRESSIVE DISORDER (HCC): Primary | ICD-10-CM

## 2024-08-20 PROCEDURE — 99214 OFFICE O/P EST MOD 30 MIN: CPT | Performed by: PHYSICIAN ASSISTANT

## 2024-08-20 RX ORDER — BUPROPION HYDROCHLORIDE 150 MG/1
150 TABLET ORAL EVERY MORNING
Qty: 30 TABLET | Refills: 3 | Status: SHIPPED | OUTPATIENT
Start: 2024-08-20

## 2024-08-20 RX ORDER — SECUKINUMAB 150 MG/ML
150 INJECTION SUBCUTANEOUS ONCE
COMMUNITY

## 2024-08-20 SDOH — ECONOMIC STABILITY: INCOME INSECURITY: HOW HARD IS IT FOR YOU TO PAY FOR THE VERY BASICS LIKE FOOD, HOUSING, MEDICAL CARE, AND HEATING?: NOT HARD AT ALL

## 2024-08-20 SDOH — ECONOMIC STABILITY: FOOD INSECURITY: WITHIN THE PAST 12 MONTHS, YOU WORRIED THAT YOUR FOOD WOULD RUN OUT BEFORE YOU GOT MONEY TO BUY MORE.: NEVER TRUE

## 2024-08-20 SDOH — ECONOMIC STABILITY: FOOD INSECURITY: WITHIN THE PAST 12 MONTHS, THE FOOD YOU BOUGHT JUST DIDN'T LAST AND YOU DIDN'T HAVE MONEY TO GET MORE.: NEVER TRUE

## 2024-08-20 ASSESSMENT — PATIENT HEALTH QUESTIONNAIRE - PHQ9
3. TROUBLE FALLING OR STAYING ASLEEP: SEVERAL DAYS
SUM OF ALL RESPONSES TO PHQ QUESTIONS 1-9: 4
5. POOR APPETITE OR OVEREATING: NOT AT ALL
1. LITTLE INTEREST OR PLEASURE IN DOING THINGS: NOT AT ALL
SUM OF ALL RESPONSES TO PHQ9 QUESTIONS 1 & 2: 3
SUM OF ALL RESPONSES TO PHQ QUESTIONS 1-9: 4
4. FEELING TIRED OR HAVING LITTLE ENERGY: NOT AT ALL
8. MOVING OR SPEAKING SO SLOWLY THAT OTHER PEOPLE COULD HAVE NOTICED. OR THE OPPOSITE, BEING SO FIGETY OR RESTLESS THAT YOU HAVE BEEN MOVING AROUND A LOT MORE THAN USUAL: NOT AT ALL
9. THOUGHTS THAT YOU WOULD BE BETTER OFF DEAD, OR OF HURTING YOURSELF: NOT AT ALL
SUM OF ALL RESPONSES TO PHQ QUESTIONS 1-9: 4
6. FEELING BAD ABOUT YOURSELF - OR THAT YOU ARE A FAILURE OR HAVE LET YOURSELF OR YOUR FAMILY DOWN: NOT AT ALL
7. TROUBLE CONCENTRATING ON THINGS, SUCH AS READING THE NEWSPAPER OR WATCHING TELEVISION: NOT AT ALL
SUM OF ALL RESPONSES TO PHQ QUESTIONS 1-9: 4
2. FEELING DOWN, DEPRESSED OR HOPELESS: NEARLY EVERY DAY
10. IF YOU CHECKED OFF ANY PROBLEMS, HOW DIFFICULT HAVE THESE PROBLEMS MADE IT FOR YOU TO DO YOUR WORK, TAKE CARE OF THINGS AT HOME, OR GET ALONG WITH OTHER PEOPLE: NOT DIFFICULT AT ALL

## 2024-08-20 ASSESSMENT — ENCOUNTER SYMPTOMS: COLOR CHANGE: 0

## 2024-08-20 NOTE — PROGRESS NOTES
Visit Information    Have you changed or started any medications since your last visit including any over-the-counter medicines, vitamins, or herbal medicines? no   Are you having any side effects from any of your medications? -  no  Have you stopped taking any of your medications? Is so, why? -  no    Have you seen any other physician or provider since your last visit? No  Have you had any other diagnostic tests since your last visit? No  Have you been seen in the emergency room and/or had an admission to a hospital since we last saw you? No  Have you had your routine dental cleaning in the past 6 months? no    Have you activated your [employer] account? If not, what are your barriers? Yes     Patient Care Team:  Larissa Hong PA-C as PCP - General (Physician Assistant)  Larissa Hong PA-C as PCP - Empaneled Provider    Medical History Review  Past Medical, Family, and Social History reviewed and  contribute to the patient presenting condition    Health Maintenance   Topic Date Due    Hepatitis A vaccine (2 of 2 - 2-dose series) 02/22/2007    Pneumococcal 0-64 years Vaccine (1 of 2 - PCV) 05/18/2008    HPV vaccine (1 - 3-dose series) Never done    HIV screen  Never done    Chlamydia/GC screen  Never done    Hepatitis C screen  Never done    Pap smear  Never done    COVID-19 Vaccine (1 - 2023-24 season) Never done    DTaP/Tdap/Td vaccine (7 - Td or Tdap) 10/21/2023    Depression Monitoring  05/09/2024    Flu vaccine (1) 08/01/2024    Hepatitis B vaccine  Completed    Hib vaccine  Completed    Polio vaccine  Completed    Varicella vaccine  Completed    Meningococcal (ACWY) vaccine  Completed    Depression Screen  Discontinued

## 2024-08-20 NOTE — PROGRESS NOTES
[facility]  [facility]  [address] [address]  Dept: [phone]  Dept Fax: [phone]    Sahra Simon is a 24 y.o. female who presents today for her medical conditions/complaintsas noted below.  Sahra Simon is c/o of   Chief Complaint   Patient presents with    Abscess     Recheck abscess       HPI:     HPI    Patient is here today for follow up after recent urgent care visit on 8/14/24 for an area of redness, swelling in the perianal area. Patient was started on keflex and diflucan which she is still completing  Patient states she has been noticing a significant improvement in the size of the lump.   Patient states her vaginal discharge is also improving. She used diflucan    Patient is also asking to restart Wellbutrin  mg daily.  Patient states she has not taken this since earlier this year and is feeling an increase in depression.  She denies any suicidal thoughts or intentions.  She states she does have support from her grandmother and her girlfriend.  She is not presently doing any counseling and does not feel the need.  She has taken Wellbutrin in the past with good success.    No results found for: \"LABA1C\"          ( goal A1Cis < 7)   No components found for: \"LABMICR\"  No components found for: \"LDLCHOLESTEROL\", \"LDLCALC\"    (goal LDL is <100)   AST (U/L)   Date Value   02/08/2024 28     ALT (U/L)   Date Value   02/08/2024 27     BUN (MG/DL)   Date Value   02/08/2024 8     BP Readings from Last 3 Encounters:   08/20/24 110/60   08/14/24 124/79   07/31/24 119/80          (goal 120/80)    Past Medical History:   Diagnosis Date    Anxiety     Depression     Psoriasis       Past Surgical History:   Procedure Laterality Date    TONSILLECTOMY         Family History   Problem Relation Age of Onset    No Known Problems Mother     No Known Problems Father     Breast Cancer Maternal Aunt     High Blood Pressure Maternal Grandmother        Social History

## 2024-09-10 ENCOUNTER — OFFICE VISIT (OUTPATIENT)
Dept: FAMILY MEDICINE CLINIC | Age: 24
End: 2024-09-10
Payer: COMMERCIAL

## 2024-09-10 VITALS
TEMPERATURE: 97.5 F | BODY MASS INDEX: 23.6 KG/M2 | HEART RATE: 76 BPM | HEIGHT: 61 IN | OXYGEN SATURATION: 98 % | SYSTOLIC BLOOD PRESSURE: 108 MMHG | WEIGHT: 125 LBS | DIASTOLIC BLOOD PRESSURE: 68 MMHG

## 2024-09-10 DIAGNOSIS — F33.1 MODERATE EPISODE OF RECURRENT MAJOR DEPRESSIVE DISORDER (HCC): Primary | ICD-10-CM

## 2024-09-10 PROCEDURE — 99213 OFFICE O/P EST LOW 20 MIN: CPT | Performed by: PHYSICIAN ASSISTANT

## 2024-09-10 ASSESSMENT — ENCOUNTER SYMPTOMS
DIARRHEA: 0
BLOOD IN STOOL: 0
ABDOMINAL PAIN: 0
COLOR CHANGE: 0
NAUSEA: 0
VOMITING: 0